# Patient Record
Sex: FEMALE | Race: WHITE | Employment: OTHER | URBAN - METROPOLITAN AREA
[De-identification: names, ages, dates, MRNs, and addresses within clinical notes are randomized per-mention and may not be internally consistent; named-entity substitution may affect disease eponyms.]

---

## 2018-09-07 ENCOUNTER — HOSPITAL ENCOUNTER (OUTPATIENT)
Age: 68
Discharge: HOME | End: 2018-09-07
Attending: FAMILY MEDICINE
Payer: MEDICARE

## 2018-09-07 VITALS
DIASTOLIC BLOOD PRESSURE: 74 MMHG | HEIGHT: 57 IN | WEIGHT: 130 LBS | SYSTOLIC BLOOD PRESSURE: 124 MMHG | HEART RATE: 84 BPM | TEMPERATURE: 98 F | OXYGEN SATURATION: 97 % | RESPIRATION RATE: 16 BRPM | BODY MASS INDEX: 28.05 KG/M2

## 2018-09-07 DIAGNOSIS — H61.21 HEARING LOSS OF RIGHT EAR DUE TO CERUMEN IMPACTION: Primary | ICD-10-CM

## 2018-09-07 PROCEDURE — 69209 REMOVE IMPACTED EAR WAX UNI: CPT

## 2018-09-07 PROCEDURE — 99203 OFFICE O/P NEW LOW 30 MIN: CPT

## 2018-09-07 RX ORDER — VENLAFAXINE HYDROCHLORIDE 150 MG/1
150 CAPSULE, EXTENDED RELEASE ORAL DAILY
COMMUNITY

## 2018-09-07 RX ORDER — CLONAZEPAM 0.5 MG/1
0.5 TABLET ORAL DAILY
COMMUNITY

## 2018-09-07 RX ORDER — ATORVASTATIN CALCIUM 10 MG/1
10 TABLET, FILM COATED ORAL NIGHTLY
COMMUNITY

## 2018-09-07 RX ORDER — PREGABALIN 75 MG/1
70 CAPSULE ORAL NIGHTLY
COMMUNITY

## 2018-09-07 RX ORDER — PREGABALIN 50 MG/1
50 CAPSULE ORAL
COMMUNITY

## 2018-09-07 NOTE — ED PROVIDER NOTES
Patient Seen in: [facility]    History   Patient presents with:  Ear Problem Pain (neurosensory)    Stated Complaint: wax impacted ears    HPI    76 year female with history of cerumen impaction and wears a hearing aid in t Reviewed - No data to display    ED Course as of Sep 07 1342  ------------------------------------------------------------  1341-recheck of ear shows cerumen is now clear via irrigation only. MDM   Patient with right-sided cerumen impaction.   Cerumen wa

## 2018-09-07 NOTE — ED INITIAL ASSESSMENT (HPI)
Says thinks her ears are impacted, tried to clean out w q tip but pushed further in. Pt is hard of hearing & wears hearing aids.

## 2020-03-03 ENCOUNTER — HOSPITAL ENCOUNTER (OUTPATIENT)
Age: 70
Discharge: HOME | End: 2020-03-03
Attending: FAMILY MEDICINE
Payer: MEDICARE

## 2020-03-03 VITALS
TEMPERATURE: 98 F | SYSTOLIC BLOOD PRESSURE: 132 MMHG | RESPIRATION RATE: 18 BRPM | OXYGEN SATURATION: 97 % | HEART RATE: 100 BPM | WEIGHT: 128 LBS | HEIGHT: 58 IN | BODY MASS INDEX: 26.87 KG/M2 | DIASTOLIC BLOOD PRESSURE: 75 MMHG

## 2020-03-03 DIAGNOSIS — H61.23 BILATERAL IMPACTED CERUMEN: Primary | ICD-10-CM

## 2020-03-03 DIAGNOSIS — H16.002 ULCER OF LEFT CORNEA: ICD-10-CM

## 2020-03-03 DIAGNOSIS — H57.89 CONTACT LENS STUCK: ICD-10-CM

## 2020-03-03 PROCEDURE — 69209 REMOVE IMPACTED EAR WAX UNI: CPT

## 2020-03-03 PROCEDURE — 99214 OFFICE O/P EST MOD 30 MIN: CPT

## 2020-03-03 PROCEDURE — 99213 OFFICE O/P EST LOW 20 MIN: CPT

## 2020-03-03 PROCEDURE — 65205 REMOVE FOREIGN BODY FROM EYE: CPT

## 2020-03-03 RX ORDER — MECLIZINE HCL 12.5 MG/1
12.5 TABLET ORAL ONCE
Status: COMPLETED | OUTPATIENT
Start: 2020-03-03 | End: 2020-03-03

## 2020-03-03 RX ORDER — POLYMYXIN B SULFATE AND TRIMETHOPRIM 1; 10000 MG/ML; [USP'U]/ML
1 SOLUTION OPHTHALMIC
Qty: 10 ML | Refills: 0 | Status: SHIPPED | OUTPATIENT
Start: 2020-03-03 | End: 2020-03-10

## 2020-03-03 NOTE — ED NOTES
PT. Developed slight vertigo symptoms during right ear flush. Stopped flush. Side rails up x 2 for safety. Dr. Americo Edgar notified. Meclizine given for further symptoms. Will monitor and continue the flush when appropriate.

## 2020-03-03 NOTE — ED NOTES
Updated travel screening. PT. Reports she was recently [place] a [place] and was exposed to people with cold-like symptoms. She does not have any cold or flu symptoms at this visit.

## 2020-03-03 NOTE — ED PROVIDER NOTES
Patient Seen in: [facility]      History   Patient presents with:  Ear Problem Pain    Stated Complaint: ear wax build up    HPI     22-year-old female with a history of anxiety, depression, for myalgia, and hyperlipidemia Supple. Full range of motion  Neuro: Alert or x3. No focal deficits. Normal gait  Vascular: Peripheral pulses are intact  Muscle skeletal: No deformity.   Full spontaneous range of motion of all limbs  Skin: Skin is intact with no lesions  Psych: Normal

## 2020-03-03 NOTE — ED INITIAL ASSESSMENT (HPI)
C/o feeling ear wax buildup in right ear. Used ear drops earlier today to help. Wears a hearing aid to left ear.

## 2022-08-31 ENCOUNTER — HOSPITAL ENCOUNTER (OUTPATIENT)
Age: 72
Discharge: HOME | End: 2022-08-31
Payer: MEDICARE

## 2022-08-31 VITALS
HEART RATE: 74 BPM | HEIGHT: 56 IN | OXYGEN SATURATION: 100 % | WEIGHT: 107 LBS | TEMPERATURE: 98 F | BODY MASS INDEX: 24.07 KG/M2 | RESPIRATION RATE: 18 BRPM | DIASTOLIC BLOOD PRESSURE: 75 MMHG | SYSTOLIC BLOOD PRESSURE: 145 MMHG

## 2022-08-31 DIAGNOSIS — B02.9 HERPES ZOSTER WITHOUT COMPLICATION: Primary | ICD-10-CM

## 2022-08-31 PROCEDURE — 99203 OFFICE O/P NEW LOW 30 MIN: CPT | Performed by: PHYSICIAN ASSISTANT

## 2022-08-31 RX ORDER — PREDNISONE 20 MG/1
40 TABLET ORAL DAILY
Qty: 10 TABLET | Refills: 0 | Status: SHIPPED | OUTPATIENT
Start: 2022-08-31 | End: 2022-09-05

## 2022-08-31 RX ORDER — ACYCLOVIR 800 MG/1
800 TABLET ORAL
Qty: 35 TABLET | Refills: 0 | Status: SHIPPED | OUTPATIENT
Start: 2022-08-31 | End: 2022-09-07

## 2022-10-17 ENCOUNTER — HOSPITAL ENCOUNTER (OUTPATIENT)
Age: 72
Discharge: HOME | End: 2022-10-17
Payer: MEDICARE

## 2022-10-17 VITALS
DIASTOLIC BLOOD PRESSURE: 77 MMHG | HEART RATE: 82 BPM | TEMPERATURE: 97 F | RESPIRATION RATE: 18 BRPM | BODY MASS INDEX: 24 KG/M2 | SYSTOLIC BLOOD PRESSURE: 130 MMHG | WEIGHT: 106.94 LBS | OXYGEN SATURATION: 97 %

## 2022-10-17 DIAGNOSIS — H61.22 IMPACTED CERUMEN OF LEFT EAR: ICD-10-CM

## 2022-10-17 DIAGNOSIS — H11.32 SUBCONJUNCTIVAL HEMATOMA, LEFT: Primary | ICD-10-CM

## 2022-10-17 DIAGNOSIS — H60.312 ACUTE DIFFUSE OTITIS EXTERNA OF LEFT EAR: ICD-10-CM

## 2022-10-17 PROCEDURE — 69210 REMOVE IMPACTED EAR WAX UNI: CPT | Performed by: NURSE PRACTITIONER

## 2022-10-17 PROCEDURE — 99213 OFFICE O/P EST LOW 20 MIN: CPT | Performed by: NURSE PRACTITIONER

## 2022-10-17 RX ORDER — CIPROFLOXACIN AND DEXAMETHASONE 3; 1 MG/ML; MG/ML
4 SUSPENSION/ DROPS AURICULAR (OTIC) 2 TIMES DAILY
Qty: 7.5 ML | Refills: 0 | Status: SHIPPED | OUTPATIENT
Start: 2022-10-17

## 2024-01-23 ENCOUNTER — HOSPITAL ENCOUNTER (OUTPATIENT)
Age: 74
Discharge: HOME | End: 2024-01-23
Payer: MEDICARE

## 2024-01-23 VITALS
DIASTOLIC BLOOD PRESSURE: 72 MMHG | SYSTOLIC BLOOD PRESSURE: 117 MMHG | BODY MASS INDEX: 25 KG/M2 | HEART RATE: 81 BPM | WEIGHT: 113 LBS | RESPIRATION RATE: 18 BRPM | OXYGEN SATURATION: 97 % | TEMPERATURE: 98 F

## 2024-01-23 DIAGNOSIS — S20.411A ABRASION OF RIGHT SIDE OF BACK, INITIAL ENCOUNTER: Primary | ICD-10-CM

## 2024-01-23 DIAGNOSIS — R21 RASH AND NONSPECIFIC SKIN ERUPTION: ICD-10-CM

## 2024-01-23 PROBLEM — R10.13 DYSPEPSIA: Status: ACTIVE | Noted: 2017-07-28

## 2024-01-23 PROCEDURE — 99213 OFFICE O/P EST LOW 20 MIN: CPT | Performed by: NURSE PRACTITIONER

## 2024-01-23 RX ORDER — TRIAMCINOLONE ACETONIDE 1 MG/G
1 OINTMENT TOPICAL 2 TIMES DAILY
Qty: 15 G | Refills: 0 | Status: SHIPPED | OUTPATIENT
Start: 2024-01-23

## 2024-01-23 NOTE — ED PROVIDER NOTES
History     Chief Complaint   Patient presents with    Rash Skin Problem       Subjective:   HPI    Inez Gautam, 73 year old female with notable medical history of n/a who presents with pruritic rash. Patient reports noticing an itchy rash to her lower back / buttock a couple months ago, which then she noticed to her upper back shortly after. She denies further spreading or progression of symptoms except for the past couple days when she feels the upper back rash has become more itchy and is concerned for infection. She has been applying antibiotic ointment since yesterday. Denies known precipitating events and admits to having dry skin.         Objective:   Past Medical History:   Diagnosis Date    Anxiety     Depression     Fibromyalgia     Hyperlipidemia               Past Surgical History:   Procedure Laterality Date    APPENDECTOMY      REMOVAL GALLBLADDER      TOTAL ABDOM HYSTERECTOMY                  Social History     Socioeconomic History    Marital status: Single   Tobacco Use    Smoking status: Never    Smokeless tobacco: Never   Vaping Use    Vaping Use: Never used   Substance and Sexual Activity    Alcohol use: Not Currently    Drug use: Never              Review of Systems   Skin:  Positive for rash.   All other systems reviewed and are negative.        Constitutional and vital signs reviewed.      All other systems reviewed and negative except as noted above.    I have reviewed the family history, social history, allergies, and outpatient medications.     History reviewed from EMR: Encounters, problem list, allergies, medications      Physical Exam     ED Triage Vitals [01/23/24 1533]   /72   Pulse 81   Resp 18   Temp 97.9 °F (36.6 °C)   Temp src Temporal   SpO2 97 %   O2 Device None (Room air)       Current:/72   Pulse 81   Temp 97.9 °F (36.6 °C) (Temporal)   Resp 18   Wt 51.3 kg   SpO2 97%   BMI 25.33 kg/m²       Physical Exam  Vitals and nursing note reviewed.    Constitutional:       General: She is not in acute distress.     Appearance: Normal appearance. She is normal weight. She is not ill-appearing or toxic-appearing.   HENT:      Head: Normocephalic and atraumatic.      Right Ear: External ear normal.      Left Ear: External ear normal.      Nose: Nose normal.      Mouth/Throat:      Mouth: Mucous membranes are moist.   Eyes:      Extraocular Movements: Extraocular movements intact.      Conjunctiva/sclera: Conjunctivae normal.      Pupils: Pupils are equal, round, and reactive to light.   Cardiovascular:      Rate and Rhythm: Normal rate.      Pulses: Normal pulses.   Pulmonary:      Effort: Pulmonary effort is normal. No respiratory distress.   Musculoskeletal:         General: No swelling, tenderness or signs of injury. Normal range of motion.      Cervical back: Normal range of motion and neck supple.   Skin:     General: Skin is warm and dry.      Capillary Refill: Capillary refill takes less than 2 seconds.      Coloration: Skin is not jaundiced.      Comments: X2 punctate erythematous markings to superior Right buttock w/o surrounding erythema   Multiple scabbing scratches to Right trapezius region w/ local erythema, but w/o cellulitis  No herpetic lesions noted   Neurological:      General: No focal deficit present.      Mental Status: She is alert and oriented to person, place, and time. Mental status is at baseline.   Psychiatric:         Mood and Affect: Mood normal.         Behavior: Behavior normal.         Thought Content: Thought content normal.         Judgment: Judgment normal.            ED Course     Labs Reviewed - No data to display  No orders to display       Vitals:    01/23/24 1533   BP: 117/72   Pulse: 81   Resp: 18   Temp: 97.9 °F (36.6 °C)   TempSrc: Temporal   SpO2: 97%   Weight: 51.3 kg            [facility]     Inez Gautam, 73 year old female with medical history as noted above who presents with pruritic rash to back   - Patient in NAD,  VSS   - arthropod bite +/- abrasions from scratching vs dermatitis vs other   - Reassurance provided   - Supportive care discussed   - f/u with primary care provider to ensure improvement in symptoms.        ** See ED course for additional information on care provided / interventions / notable events throughout patient's encounter.    ** See below for home care instructions (if applicable)         I have independently reviewed the radiology images, clinical lab results, and ECG tracings as described above (if applicable)        Medical Decision Making  Risk  OTC drugs.  Prescription drug management.        Disposition and Plan     Clinical Impression:  1. Abrasion of right side of back, initial encounter    2. Rash and nonspecific skin eruption         Disposition:  Discharge  1/23/2024  4:00 pm    Follow-up:  Sienna Alvarado, [place]  [address] [facility] [address]  [phone]      As needed, For wound re-check          Medications Prescribed:  Current Discharge Medication List        START taking these medications    Details   triamcinolone 0.1 % External Ointment Apply 1 Application topically 2 (two) times daily.  Qty: 15 g, Refills: 0      mupirocin 2 % External Ointment Apply 1 Application topically 2 (two) times daily for 14 days.  Qty: 15 g, Refills: 0    Comments: OK to substitute volume             The above patient (and/or guardian) was made aware that an appropriate evaluation has been performed, and that no additional testing is required at this time. In my medical judgment, there is currently no evidence of an immediate life-threatening or surgical condition, therefore discharge is indicated at this time. The patient (and/or guardian) was advised that a small risk still exists that a serious condition could develop. The patient was instructed to arrange close follow-up with their primary care provider (or the referral provider given today). The patient received written and verbal instructions regarding  their condition / concerns, demonstrated understanding, and is agreement with the outpatient treatment plan.        Home care instructions:    Apply mixture of topical steroid (either hydrocortisone or triamcinolone) and topical antibiotic ointment (Mupirocin) twice daily for at least 1-week, and cover to prevent from rubbing off    Clean and dry gently between applications    Do not scratch    Drink plenty of water    Clean linens    You may benefit from taking Claritin or Zyrtec daily for itching    Follow up with your primary care provider to ensure improvement      Lee Lauren, DNP, APRN, AGACNP-BC, FNP-C, CNL  Adult-Gerontology Acute Care & Family Nurse Practitioner  [facility]

## 2024-01-23 NOTE — ED INITIAL ASSESSMENT (HPI)
Rash to upper back, itching - so now has a deep scratch.  Also has small area to right butt cheek - says it hurts.  Noticed a few weeks ago.

## 2024-01-23 NOTE — DISCHARGE INSTRUCTIONS
Apply mixture of topical steroid (either hydrocortisone or triamcinolone) and topical antibiotic ointment (Mupirocin) twice daily for at least 1-week, and cover to prevent from rubbing off    Clean and dry gently between applications    Do not scratch    Drink plenty of water    Clean linens    You may benefit from taking Claritin or Zyrtec daily for itching    Follow up with your primary care provider to ensure improvement

## 2024-06-05 ENCOUNTER — APPOINTMENT (OUTPATIENT)
Dept: GENERAL RADIOLOGY | Age: 74
End: 2024-06-05
Attending: NURSE PRACTITIONER
Payer: MEDICARE

## 2024-06-05 ENCOUNTER — HOSPITAL ENCOUNTER (OUTPATIENT)
Age: 74
Discharge: HOME | End: 2024-06-05
Payer: MEDICARE

## 2024-06-05 VITALS
RESPIRATION RATE: 22 BRPM | DIASTOLIC BLOOD PRESSURE: 68 MMHG | OXYGEN SATURATION: 96 % | SYSTOLIC BLOOD PRESSURE: 133 MMHG | BODY MASS INDEX: 25.19 KG/M2 | HEART RATE: 86 BPM | HEIGHT: 56 IN | WEIGHT: 112 LBS | TEMPERATURE: 99 F
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
## 2024-06-05 DIAGNOSIS — S92.254A CLOSED NONDISPLACED FRACTURE OF NAVICULAR BONE OF RIGHT FOOT, INITIAL ENCOUNTER: ICD-10-CM

## 2024-06-05 PROCEDURE — 99214 OFFICE O/P EST MOD 30 MIN: CPT | Performed by: NURSE PRACTITIONER

## 2024-06-05 PROCEDURE — 73610 X-RAY EXAM OF ANKLE: CPT | Performed by: NURSE PRACTITIONER

## 2024-06-05 PROCEDURE — 73630 X-RAY EXAM OF FOOT: CPT | Performed by: NURSE PRACTITIONER

## 2024-06-05 PROCEDURE — 29515 APPLICATION SHORT LEG SPLINT: CPT | Performed by: NURSE PRACTITIONER

## 2024-06-05 NOTE — ED PROVIDER NOTES
Patient Seen in: [facility]    History     Chief Complaint   Patient presents with    Leg or Foot Injury     Stated Complaint: Injured foot    HPI    HPI: Inez Gautam is a 74 year old female who presents after an injury to the right foot that occurred 6 days ago. The patient was walking down the stairs when he slipped and dragged the dorsum of the foot over the stairs as she fell.  She has been limping but able  to bear weight immediately after the injury and here  Patient describes achy and throbbing and rates it at a 7 /10.   Pain worse with movement and weight bearing. No knee pain. No other joint pain or injuries.     Past Medical History:    Anxiety    Depression    Fibromyalgia    Hyperlipidemia       Past Surgical History:   Procedure Laterality Date    Appendectomy      Removal gallbladder      Total abdom hysterectomy              No family history on file.    Social History     Socioeconomic History    Marital status: Single   Tobacco Use    Smoking status: Never    Smokeless tobacco: Never   Vaping Use    Vaping status: Never Used   Substance and Sexual Activity    Alcohol use: Not Currently    Drug use: Never     Social Determinants of Health     Financial Resource Strain: Low Risk  (5/14/2024)    Received from [facility]    Financial Resource Strain     How hard is it for you to pay for the very basics like food, housing, medical care, and heating?: Not hard at all     Are you currently unemployed and requesting resources?: No   Food Insecurity: No Food Insecurity (5/14/2024)    Received from [facility]    Hunger Vital Sign     Worried About Running Out of Food in the Last Year: Never true     Ran Out of Food in the Last Year: Never true   Transportation Needs: No Transportation Needs (5/14/2024)    Received from [facility]    PRAPARE - Transportation     Lack of Transportation (Medical): No     Lack of Transportation  (Non-Medical): No   Social Connections: Socially Integrated (5/14/2024)    Received from [facility] [employer] [facility]    Social Connections     Are you able to get out and take part in interests, activities, hobbies, or social events?: Yes   Housing Stability: Low Risk  (5/14/2024)    Received from [facility]    Housing Stability Vital Sign     In the last 12 months, was there a time when you did not have a steady place to sleep or slept in a shelter (including now)?: No     Do you worry about losing your housing?: No     In the place you currently live, do you have problems with any of the following?: --       Review of Systems    Positive for stated complaint: Injured foot  Other systems are as noted in HPI.  Constitutional and vital signs reviewed.      All other systems reviewed and negative except as noted above.    PSFH elements reviewed from today and agreed except as otherwise stated in HPI.    Physical Exam     ED Triage Vitals [06/05/24 1737]   /68   Pulse 86   Resp 21   Temp 98.9 °F (37.2 °C)   Temp src Temporal   SpO2 96 %   O2 Device None (Room air)       Current:/68   Pulse 86   Temp 98.9 °F (37.2 °C) (Temporal)   Resp 22   Ht 142.2 cm (4' 8\")   Wt 50.8 kg   SpO2 96%   BMI 25.11 kg/m²         Physical Exam      MENTAL STATUS: Alert, oriented, and cooperative. No focal deficit  HEAD: Atraumatic  NECK: Supple, full range of motion without pain or paresthesias  EXTREMITIES: The right  foot is tender over dorsal aspect.  There is a small amount of swelling.  + ecchymosis.  There is no ligamentous instability . There is no notable deformity.  Achilles is palpated and intact functionally.  Full range of motion of the knee on that side without pain. No proximal tib fib tenderness.  NEURO:Sensation to touch is intact.  SKIN: No open wounds, no rashes.  PSYCH: Normal affect. Calm and cooperative.    ED Course   Labs Reviewed - No data to display    MDM   Radiology:  No results  found.      My independent interpretation of the x-ray film shows no fractures or dislocations.  Radiology report shows that there is chips along the dorsum of the navicular bone, best seen on the lateral view.    Patient was placed in a short leg postmold.  She was discharged home in stable condition to follow-up with podiatry.  Patient verbalized understanding of all instructions given.  She was discharged to follow-up and to return with any worsening symptoms or concerns    Disposition and Plan     Clinical Impression:  1. Fall (on) (from) other stairs and steps, initial encounter    2. Closed nondisplaced fracture of navicular bone of right foot, initial encounter        Disposition:  Discharge    Follow-up:  Jennifer Marie, PA  [facility] [place] [address] [id number] [phone]            Medications Prescribed:  Discharge Medication List as of 6/5/2024  6:57 PM

## 2024-06-07 ENCOUNTER — OFFICE VISIT (OUTPATIENT)
Dept: ORTHOPEDICS CLINIC | Facility: CLINIC | Age: 74
End: 2024-06-07
Payer: MEDICARE

## 2024-06-07 VITALS — HEIGHT: 56 IN | WEIGHT: 112 LBS | BODY MASS INDEX: 25.19 KG/M2

## 2024-06-07 DIAGNOSIS — S92.254A CLOSED NONDISPLACED FRACTURE OF NAVICULAR BONE OF RIGHT FOOT, INITIAL ENCOUNTER: Primary | ICD-10-CM

## 2024-06-07 PROCEDURE — 99213 OFFICE O/P EST LOW 20 MIN: CPT | Performed by: PHYSICIAN ASSISTANT

## 2024-06-07 NOTE — H&P
[facility] - ORTHOPEDICS  [address] [place] [id number]  [phone]     NEW PATIENT VISIT - HISTORY AND PHYSICAL EXAMINATION     Name: Inez Gautam   MRN: [id number]  Date: 6/7/2024     CC: Right ankle pain.     REFERRED BY: STEVE ORDONEZ MD    HPI:   Inez Gautam is a very pleasant 74 year old female who presents today for evaluation, consultation, and management of left ankle injury that occurred on Wednesday, May 28 after she had a misstep on the last step and fell on her right foot.  She complains of swelling, weakness and instability.  Pain is a 6 out of 10 and notes less than 50% normal function.  Pain is worse with weightbearing and walking.  She is retired.  She was referred from the immediate care for further evaluation management.       PMH:   Past Medical History:    Anxiety    Depression    Fibromyalgia    Hyperlipidemia       PAST SURGICAL HX:  Past Surgical History:   Procedure Laterality Date    Appendectomy      Removal gallbladder      Total abdom hysterectomy         FAMILY HX:  History reviewed. No pertinent family history.    ALLERGIES:  Codeine    MEDICATIONS:   Current Outpatient Medications   Medication Sig Dispense Refill    Misc. Devices (WALKER) Does not apply Misc  walker 1 each 0    triamcinolone 0.1 % External Ointment Apply 1 Application topically 2 (two) times daily. 15 g 0    ciprofloxacin-dexamethasone 0.3-0.1 % Otic Suspension Place 4 drops into the left ear 2 (two) times daily. 7.5 mL 0    Estradiol (ESTRACE VA) Place vaginally.      Venlafaxine HCl  MG Oral Capsule SR 24 Hr Take 1 capsule (150 mg total) by mouth daily.      pregabalin 50 MG Oral Cap Take 1 capsule (50 mg total) by mouth every morning before breakfast.      pregabalin 75 MG Oral Cap Take 70 mg by mouth nightly.      atorvastatin 10 MG Oral Tab Take 1 tablet (10 mg total) by mouth nightly.      ClonazePAM 0.5 MG Oral Tab Take 1 tablet (0.5 mg total) by  mouth daily.         ROS: A comprehensive 14 point review of systems was performed and was negative aside from the aforementioned per history of present illness.    SOCIAL HX:  Social History     Occupational History    Not on file   Tobacco Use    Smoking status: Never    Smokeless tobacco: Never   Vaping Use    Vaping status: Never Used   Substance and Sexual Activity    Alcohol use: Not Currently    Drug use: Never    Sexual activity: Not on file       PE:   Vitals:    06/07/24 0939   Weight: 112 lb (50.8 kg)   Height: 4' 8\" (1.422 m)     Estimated body mass index is 25.11 kg/m² as calculated from the following:    Height as of this encounter: 4' 8\" (1.422 m).    Weight as of this encounter: 112 lb (50.8 kg).    Physical Exam  Constitutional:       Appearance: Normal appearance.   HENT:      Head: Normocephalic and atraumatic.   Eyes:      Extraocular Movements: Extraocular movements intact.   Neck:      Musculoskeletal: Normal range of motion and neck supple.   Cardiovascular:      Pulses: Normal pulses.   Pulmonary:      Effort: Pulmonary effort is normal. No respiratory distress.   Abdominal:      General: There is no distension.   Skin:     General: Skin is warm.      Capillary Refill: Capillary refill takes less than 2 seconds.      Findings: No bruising.   Neurological:      General: No focal deficit present.      Mental Status: Alert.   Psychiatric:         Mood and Affect: Mood normal.     Examination of the right foot/ankle demonstrates:     Skin is intact, warm and dry.     Inspection:   Atrophy: none    Effusion: none    Edema: mild    Erythema: none    Hematoma: none      Palpation:   Anterior Talo-Fibular Ligament (ATFL): none    Fibular Ligament (PTFL): none    Calcaneo-Fibular Ligament (CFL): none    Achilles Tendon: none    Peroneal Tendon: none    Posterior Tib Tendon: none    Talar dome: none    Metatarsal bones: none    Joint line tenderness: none  Crepitation: none     ROM:   Dorsiflexion: 10  degrees.  Plantarflexion: 40 degrees.  Eversion:  20 degrees  Inversion: 30 degrees.    Strength: mild weakness     Special Tests:   Anterior Drawer Test ATFL Rupture: Negative  CFL Forced Inversion: Negative   Talar Dome:  Negative   Gait:  mildly antalgic   Leg length: equal and symmetric  Alignment:  neutral     No obvious peripheral edema noted.   Distal neurovascular exam demonstrates normal perfusion, intact sensation to light touch and full strength.         Radiographic Examination/Diagnostics:  I personally viewed, independently interpreted and radiology report was reviewed.      XR ANKLE (MIN 3 VIEWS), RIGHT (CPT=73610)    Result Date: 6/5/2024  PROCEDURE:  XR ANKLE (MIN 3 VIEWS), RIGHT (CPT=73610)  TECHNIQUE:  Three views were obtained.  COMPARISON:  None.  INDICATIONS:  Foot/ankle pain  PATIENT STATED HISTORY: (As transcribed by Technologist)  Medial and lateral right calcaneus pain and bruising.Right dorsal foot pain at the cuboid bone. Patient missed a step and fell on her foot.    FINDINGS:  Chip fractures along the dorsal aspect of the navicular bone best seen on the lateral view with overlying soft tissue swelling.  Anterolateral soft tissue swelling.  Mild plantar and moderate dorsal calcaneal spurring.  Ankle mortise intact.  Normal mineralization.            CONCLUSION:  Chip fractures along the dorsal aspect of the navicular bone best seen on the lateral view with overlying soft tissue swelling   LOCATION:  [facility]   Dictated by (CST): Deven Manzo MD on 6/05/2024 at 6:02 PM     Finalized by (CST): Deven Manzo MD on 6/05/2024 at 6:03 PM       XR FOOT, COMPLETE (MIN 3 VIEWS), RIGHT (CPT=73630)    Result Date: 6/5/2024  PROCEDURE:  XR FOOT, COMPLETE (MIN 3 VIEWS), RIGHT (CPT=73630)  TECHNIQUE:  AP, oblique, and lateral views were obtained.  COMPARISON:  [facility], XR, XR ANKLE (MIN 3 VIEWS), RIGHT (CPT=73610), 6/05/2024, 5:45 PM.  INDICATIONS:  Foot pain  PATIENT STATED HISTORY: (As  transcribed by Technologist)  Right dorsal foot pain at the cuboid bone and bruising across the MTP joints. Medial and lateral calcaneus bruising. Patient missed a step and fell on her foot.   FINDINGS:  Chip fractures along the dorsum of the navicular bone best seen on the lateral view with overlying soft tissue swelling.  Scattered mild osteoarthritic changes in the interphalangeal joints and 1st MTP joint.  Mild plantar and dorsal calcaneal  spurring.  Normal mineralization.             CONCLUSION:  Chip fractures along the dorsum of the navicular bone best seen on the lateral view with overlying soft tissue swelling.   LOCATION:  [facility]   Dictated by (CST): Deven Manzo MD on 6/05/2024 at 6:01 PM     Finalized by (CST): Deven aMnzo MD on 6/05/2024 at 6:02 PM         IMPRESSION: Inez Gautam is a 74 year old female who presents with right ankle navicular bone fracture.     PLAN:   We had a detailed discussion outlining the etiology, anatomy, pathophysiology, and natural history of the patient's findings. Imaging was reviewed in detail and correlated to a 3-dimensional model of the patient's pathology.     We reviewed the treatment of this disease condition.  Fortunately, treatment is amenable to conservative treatment which we chose to optimize at today's visit.  We recommend CAM boot. We recommended physical therapy to aid in strengthening, range of motion, functional improvement, and return to baseline activity.  The patient had the opportunity to ask questions and all questions were answered appropriately.      FOLLOW-UP:  Return to clinic in eight weeks. No imaging required at next visit.             Jennifer Marie [facility], PA-C Orthopedic Surgery / Sports Medicine Specialist  [facility]  [address] [place] [id number]   [email or web address]  [email or web address]  [phone]: [phone]  o: [phone]  f: [phone]    This note was dictated using Dragon software.  While it  was briefly proofread prior to completion, some grammatical, spelling, and word choice errors due to dictation may still occur.

## 2024-07-01 ENCOUNTER — MED REC SCAN ONLY (OUTPATIENT)
Facility: CLINIC | Age: 74
End: 2024-07-01

## 2024-07-02 ENCOUNTER — TELEPHONE (OUTPATIENT)
Dept: ORTHOPEDICS CLINIC | Facility: CLINIC | Age: 74
End: 2024-07-02

## 2024-07-02 NOTE — TELEPHONE ENCOUNTER
I have contacted the patient and advised her to come to the clinic tomorrow to have her boot fixed. I will inform Lexa to assist the patient upon her arrival. The patient is expected to arrive around 11:00 AM. She has been made aware that there might be a waiting time before she can be attended to.

## 2024-07-02 NOTE — TELEPHONE ENCOUNTER
There is no one from [facility] in today.  Marilee will be in [facility] tomorrow on Wednesday.  Will need to know what time pt thinks she might be able to come in.

## 2024-07-02 NOTE — TELEPHONE ENCOUNTER
Patient called and wants to know if she is able to come into office to get her walking boot fixed. The inside is stretched out and the velcro is not sticking and coming off. Please advise.

## 2024-08-05 ENCOUNTER — OFFICE VISIT (OUTPATIENT)
Dept: ORTHOPEDICS CLINIC | Facility: CLINIC | Age: 74
End: 2024-08-05
Payer: MEDICARE

## 2024-08-05 ENCOUNTER — TELEPHONE (OUTPATIENT)
Dept: ORTHOPEDICS CLINIC | Facility: CLINIC | Age: 74
End: 2024-08-05

## 2024-08-05 DIAGNOSIS — S92.254D CLOSED NONDISPLACED FRACTURE OF NAVICULAR BONE OF RIGHT FOOT WITH ROUTINE HEALING, SUBSEQUENT ENCOUNTER: Primary | ICD-10-CM

## 2024-08-05 PROCEDURE — 99213 OFFICE O/P EST LOW 20 MIN: CPT | Performed by: PHYSICIAN ASSISTANT

## 2024-08-05 NOTE — PROGRESS NOTES
[facility] - ORTHOPEDICS  [address] [place] [id number]  [phone]       Name: Inez Gautam   MRN: [id number]  Date: 8/5/2024     REASON FOR VISIT: Follow up for right ankle navicular bone fracture.     INTERVAL HISTORY:  Inez Gautam is a 74 year old female who returns for evaluation of right ankle injury that occurred on Wednesday, May 28 after she had a misstep on the last step and fell on her right foot. She complains of swelling, weakness and instability. Pain is a 6 out of 10 and notes less than 50% normal function. Pain is worse with weightbearing and walking. She is retired. She was referred from the immediate care for further evaluation management.     ROS: ROS    PE:   There were no vitals filed for this visit.  Estimated body mass index is 25.11 kg/m² as calculated from the following:    Height as of 6/7/24: 4' 8\" (1.422 m).    Weight as of 6/7/24: 112 lb (50.8 kg).    Physical Exam  Constitutional:       Appearance: Normal appearance.   HENT:      Head: Normocephalic and atraumatic.   Eyes:      Extraocular Movements: Extraocular movements intact.   Neck:      Musculoskeletal: Normal range of motion and neck supple.   Cardiovascular:      Pulses: Normal pulses.   Pulmonary:      Effort: Pulmonary effort is normal. No respiratory distress.   Abdominal:      General: There is no distension.   Skin:     General: Skin is warm.      Capillary Refill: Capillary refill takes less than 2 seconds.      Findings: No bruising.   Neurological:      General: No focal deficit present.      Mental Status: She is alert.   Psychiatric:         Mood and Affect: Mood normal.     Examination of the right  foot demonstrates:     Skin is intact, warm and dry.     Inspection:   Atrophy: none    Effusion: none    Edema: mild    Erythema: none    Hematoma: none      Palpation:   Tenderness at fracture site.     ROM:   Not tested d/t nature of injury and fracture.     Strength: Not  tested d/t nature of injury and fracture.     Special Tests:   Not tested d/t nature of injury and fracture.     No obvious peripheral edema noted.   Distal neurovascular exam demonstrates normal perfusion, intact sensation to light touch and full strength.     Examination of the contralateral extremity demonstrates:  No significant atrophy, swelling or effusion. Full range of motion. Neurovascularly intact distally.  The contralateral upper extremity is without limitation in range of motion or strength, no positive provocative maneuvers.       Radiographic Examination/Diagnostics:    I personally viewed, independently interpreted and radiology report was reviewed.    No results found.    IMPRESSION: Inez Gautam is a 74 year old female who presented for follow up of right ankle navicular bone fracture.     PLAN:   We had a detailed discussion outlining the etiology, anatomy, pathophysiology, and natural history of the patient's findings.    We reviewed the treatment of this disease condition.   We recommended physical therapy to aid in strengthening, range of motion, functional improvement, and return to baseline activity.  The patient had opportunity to ask questions and all questions were answered appropriately.    FOLLOW-UP:  Return to clinic on an as needed basis.             [facility], PA-C Orthopedic Surgery / Sports Medicine Specialist  [facility]  [address] [place] [email or web address]  [email or web address]  t: [phone]  o: [phone]  f: [phone]    This note was dictated using Dragon software.  While it was briefly proofread prior to completion, some grammatical, spelling, and word choice errors due to dictation may still occur.

## 2024-08-09 ENCOUNTER — MED REC SCAN ONLY (OUTPATIENT)
Dept: ORTHOPEDICS CLINIC | Facility: CLINIC | Age: 74
End: 2024-08-09